# Patient Record
Sex: MALE | URBAN - METROPOLITAN AREA
[De-identification: names, ages, dates, MRNs, and addresses within clinical notes are randomized per-mention and may not be internally consistent; named-entity substitution may affect disease eponyms.]

---

## 2017-03-23 ENCOUNTER — APPOINTMENT (RX ONLY)
Dept: URBAN - METROPOLITAN AREA CLINIC 57 | Facility: CLINIC | Age: 52
Setting detail: DERMATOLOGY
End: 2017-03-23

## 2017-03-23 DIAGNOSIS — F17.200 NICOTINE DEPENDENCE, UNSPECIFIED, UNCOMPLICATED: ICD-10-CM

## 2017-03-23 DIAGNOSIS — L40.4 GUTTATE PSORIASIS: ICD-10-CM

## 2017-03-23 PROBLEM — L29.8 OTHER PRURITUS: Status: ACTIVE | Noted: 2017-03-23

## 2017-03-23 PROCEDURE — ? TREATMENT REGIMEN

## 2017-03-23 PROCEDURE — 99203 OFFICE O/P NEW LOW 30 MIN: CPT

## 2017-03-23 PROCEDURE — ? PRESCRIPTION

## 2017-03-23 PROCEDURE — ? COUNSELING

## 2017-03-23 RX ORDER — DESOXIMETASONE 2.5 MG/ML
SPRAY TOPICAL
Qty: 1 | Refills: 2 | Status: ERX | COMMUNITY
Start: 2017-03-23

## 2017-03-23 RX ADMIN — DESOXIMETASONE: 2.5 SPRAY TOPICAL at 13:09

## 2017-03-23 ASSESSMENT — LOCATION ZONE DERM: LOCATION ZONE: TRUNK

## 2017-03-23 ASSESSMENT — LOCATION DETAILED DESCRIPTION DERM: LOCATION DETAILED: LEFT MID-UPPER BACK

## 2017-03-23 ASSESSMENT — LOCATION SIMPLE DESCRIPTION DERM: LOCATION SIMPLE: LEFT UPPER BACK

## 2017-03-23 NOTE — PROCEDURE: TREATMENT REGIMEN
Detail Level: Zone
Action 3: Continue
Initiate Regimen: Gave sample of TSal to leave in 10 minutes before rinsing out

## 2017-04-10 ENCOUNTER — APPOINTMENT (RX ONLY)
Dept: URBAN - METROPOLITAN AREA CLINIC 57 | Facility: CLINIC | Age: 52
Setting detail: DERMATOLOGY
End: 2017-04-10

## 2017-04-10 DIAGNOSIS — L40.4 GUTTATE PSORIASIS: ICD-10-CM | Status: INADEQUATELY CONTROLLED

## 2017-04-10 PROCEDURE — ? COUNSELING

## 2017-04-10 PROCEDURE — ? STELARA INITIATION

## 2017-04-10 PROCEDURE — 99213 OFFICE O/P EST LOW 20 MIN: CPT

## 2017-04-10 PROCEDURE — 86580 TB INTRADERMAL TEST: CPT

## 2017-04-10 PROCEDURE — ? TREATMENT REGIMEN

## 2017-04-10 PROCEDURE — ? ORDER TESTS

## 2017-04-10 PROCEDURE — ? PPD

## 2017-04-10 PROCEDURE — ? PRESCRIPTION

## 2017-04-10 RX ORDER — USTEKINUMAB 45 MG/.5ML
INJECTION, SOLUTION SUBCUTANEOUS
Qty: 0.5 | Refills: 5 | Status: CANCELLED
Stop reason: CLARIF

## 2017-04-10 ASSESSMENT — LOCATION SIMPLE DESCRIPTION DERM
LOCATION SIMPLE: LEFT UPPER BACK
LOCATION SIMPLE: RIGHT FOREARM

## 2017-04-10 ASSESSMENT — LOCATION ZONE DERM
LOCATION ZONE: TRUNK
LOCATION ZONE: ARM

## 2017-04-10 ASSESSMENT — LOCATION DETAILED DESCRIPTION DERM
LOCATION DETAILED: LEFT MID-UPPER BACK
LOCATION DETAILED: RIGHT VENTRAL PROXIMAL FOREARM

## 2017-04-10 NOTE — PROCEDURE: TREATMENT REGIMEN
Detail Level: Zone
Action 4: Continue
Initiate Regimen: Gave sample of TSal to leave in 10 minutes before rinsing out
Plan: Discussed UVB, but he travels a lot for his job so that would not be practical. Discussed using MTX and the side effects along with biologics. He would like to try biologic so will try to get him approved for [facility].

## 2017-05-26 ENCOUNTER — RX ONLY (OUTPATIENT)
Age: 52
Setting detail: RX ONLY
End: 2017-05-26

## 2017-05-26 RX ORDER — ADALIMUMAB 40MG/0.8ML
KIT SUBCUTANEOUS
Qty: 1 | Refills: 5 | Status: ERX | COMMUNITY
Start: 2017-05-26

## 2017-05-30 ENCOUNTER — RX ONLY (OUTPATIENT)
Age: 52
Setting detail: RX ONLY
End: 2017-05-30

## 2017-05-30 RX ORDER — ADALIMUMAB 40MG/0.8ML
KIT SUBCUTANEOUS
Qty: 1 | Refills: 5 | Status: ERX

## 2018-05-22 ENCOUNTER — APPOINTMENT (RX ONLY)
Dept: URBAN - METROPOLITAN AREA CLINIC 57 | Facility: CLINIC | Age: 53
Setting detail: DERMATOLOGY
End: 2018-05-22

## 2018-05-22 DIAGNOSIS — L40.4 GUTTATE PSORIASIS: ICD-10-CM

## 2018-05-22 DIAGNOSIS — Z79.899 OTHER LONG TERM (CURRENT) DRUG THERAPY: ICD-10-CM

## 2018-05-22 PROBLEM — L40.0 PSORIASIS VULGARIS: Status: ACTIVE | Noted: 2018-05-22

## 2018-05-22 PROCEDURE — 99213 OFFICE O/P EST LOW 20 MIN: CPT

## 2018-05-22 PROCEDURE — ? COUNSELING

## 2018-05-22 PROCEDURE — ? TREATMENT REGIMEN

## 2018-05-22 PROCEDURE — ? PRESCRIPTION

## 2018-05-22 RX ORDER — ADALIMUMAB 40MG/0.8ML
KIT SUBCUTANEOUS
Qty: 1 | Refills: 11 | Status: ERX | COMMUNITY
Start: 2018-05-22

## 2018-05-22 RX ORDER — FLUOCINONIDE 0.5 MG/ML
SOLUTION TOPICAL
Qty: 60 | Refills: 5 | Status: ERX | COMMUNITY
Start: 2018-05-22

## 2018-05-22 RX ADMIN — FLUOCINONIDE: 0.5 SOLUTION TOPICAL at 19:27

## 2018-05-22 RX ADMIN — ADALIMUMAB: KIT at 19:27

## 2018-05-22 ASSESSMENT — LOCATION SIMPLE DESCRIPTION DERM: LOCATION SIMPLE: LEFT UPPER BACK

## 2018-05-22 ASSESSMENT — LOCATION ZONE DERM: LOCATION ZONE: TRUNK

## 2018-05-22 ASSESSMENT — LOCATION DETAILED DESCRIPTION DERM: LOCATION DETAILED: LEFT MID-UPPER BACK

## 2018-05-22 NOTE — PROCEDURE: TREATMENT REGIMEN
Initiate Regimen: Fluocinonide Solutuion to scalp BID PRN
Detail Level: Zone
Action 3: Continue
Continue Regimen: Humira injections biwekly

## 2020-06-15 ENCOUNTER — OFFICE VISIT (OUTPATIENT)
Dept: URGENT CARE | Facility: CLINIC | Age: 55
End: 2020-06-15
Payer: OTHER MISCELLANEOUS

## 2020-06-15 VITALS
DIASTOLIC BLOOD PRESSURE: 90 MMHG | TEMPERATURE: 98.5 F | OXYGEN SATURATION: 99 % | HEART RATE: 79 BPM | WEIGHT: 168.5 LBS | SYSTOLIC BLOOD PRESSURE: 138 MMHG

## 2020-06-15 DIAGNOSIS — S81.812A LACERATION OF SKIN OF LEFT LOWER LEG, INITIAL ENCOUNTER: Primary | ICD-10-CM

## 2020-06-15 PROCEDURE — 99202 OFFICE O/P NEW SF 15 MIN: CPT | Performed by: NURSE PRACTITIONER

## 2020-06-15 RX ORDER — ADALIMUMAB 40MG/0.8ML
40 KIT SUBCUTANEOUS
COMMUNITY
Start: 2020-06-01

## 2020-06-15 NOTE — PROGRESS NOTES
Subjective     Abrahan is a 54-year-old male who presents with multiple lacerations.    HPI    Abrahan is a 54-year-old male who presents to urgent care today with chief complaint of multiple lacerations to his left lower shin following an injury at work.  He reports that he was working with a  when it suddenly slipped from his hand falling and grazing his left shin.  He was able to kick it away with his right foot before causing much more damage.  It did bleed a small amount at the time of injury.  He covered it with a paper towel and presented to urgent care.  He reports his pain 3/10, constant burning in nature.  He denies decreased range of motion, numbness or tingling to the affected extremity.  He has not taken anything over-the-counter for pain relief.  Tetanus up-to-date, 3 years ago.  He is a current smoker, 1-1/2 packs/day.  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  He reports a significant history of RA and is on Humira.    The following have been reviewed and updated as appropriate in this visit:         Review of Systems    As noted in HPI.    Objective   /90 (BP Location: Left arm, Patient Position: Sitting, Cuff Size: Long Adult)   Pulse 79   Temp 36.9 °C (98.5 °F) (Temporal)   Wt 76.4 kg (168 lb 8 oz)   SpO2 99%     Physical Exam  Vitals signs and nursing note reviewed.   Constitutional:       Appearance: Normal appearance.   Cardiovascular:      Rate and Rhythm: Normal rate and regular rhythm.      Pulses: Normal pulses.      Heart sounds: Normal heart sounds.   Pulmonary:      Effort: Pulmonary effort is normal.      Breath sounds: Normal breath sounds.   Musculoskeletal: Normal range of motion.   Skin:     General: Skin is warm and dry.      Capillary Refill: Capillary refill takes less than 2 seconds.      Findings: Laceration present.      Comments: There are multiple superficial lacerations to his left shin area.  5 longer ones measuring about 3 to 5 cm  each.  Many shorter ones between measuring about 2 to 3 cm each.  Hemostasis achieved.   Neurological:      General: No focal deficit present.      Mental Status: He is alert and oriented to person, place, and time. Mental status is at baseline.   Psychiatric:         Mood and Affect: Mood normal.         Behavior: Behavior normal.         Thought Content: Thought content normal.         Judgment: Judgment normal.       Assessment/Plan   Diagnoses and all orders for this visit:    Laceration of skin of left lower leg, initial encounter    The wounds were cleansed with Hibiclens and normal saline.  Bacitracin was applied to each wound.  Wounds were covered with Telfa dressing and Coban.  He is to leave the dressing on for the next 24 hours, keeping it clean and dry.  He may leave the wound open to air after this time and apply bacitracin daily for a few days.  Patient was educated on wound care and signs and symptoms of infection.  I have advised the patient on smoking cessation.  Strongly encouraged him to at least cut back while wounds are healing.  Patient should follow up with urgent care if not improving. Discussed signs and symptoms of when to return sooner. Patient voices understanding and agrees with plan.     Kaye Jimenez, CNP

## 2020-06-15 NOTE — PATIENT INSTRUCTIONS
Patient Education     Nonsutured Laceration Care  A laceration is a cut that may go through all layers of the skin and extend into the tissue that is right under the skin. This type of cut is usually stitched up (sutured) or closed with tape (adhesive strips) or skin glue shortly after the injury happens.  However, if the wound is dirty or if several hours pass before medical treatment is provided, it is likely that germs (bacteria) will enter the wound. Closing a laceration after bacteria have entered it increases the risk of infection. In these cases, your health care provider may leave the laceration open (nonsutured) and cover it with a bandage. This type of treatment helps prevent infection and allows the wound to heal from the deepest layer of tissue damage up to the surface.  An open fracture is a type of injury that may involve nonsutured lacerations. An open fracture is a break in a bone that happens along with lacerations through the skin at the fracture site.  What are the risks?  Caring for a nonsutured laceration is safe. However, problems may occur, including a higher risk for:  · Scarring.  · Infection.  · Slow healing.  Supplies needed:  · Soap.  · Hand .  · Sterile water or irrigation solution.  · Bandages (dressings).  · Clean towel.  · Antibiotic ointment.  How to care for your nonsutured laceration  Follow instructions from your health care provider about how to take care of your wound.  · Keep the wound clean and dry.  · Change any dressings as told by your health care provider. This includes changing the dressing when it starts to smell, or when it gets wet or dirty.  · Clean the wound one time each day, or as often as told by your health care provider. To clean your wound:  ? Wash your hands with soap and water. If soap and water are not available, use hand .  ? Remove any dressing as told by your health care provider.  ? Clean the wound with sterile water or irrigation  solution as told by your health care provider.  ? Pat the wound dry with a clean towel. Do not rub the wound.  ? Apply a thin layer of antibiotic ointment to the wound as told by your health care provider. This will prevent infection and keep the dressing from sticking to the wound.  ? Apply a new dressing as told by your health care provider.  · Check your wound every day for signs of infection. Watch for:  ? Redness, swelling, or pain.  ? Fluid, blood, or pus.  ? Bad smell on the wound or dressing.  ? Warmth.  · Do not take baths, swim, or do anything that puts your wound underwater until your health care provider approves.  · Do not scratch or pick at the wound.  Follow these instructions at home:  · Take or apply over-the-counter and prescription medicines only as told by your health care provider.  · If you were prescribed an antibiotic medicine, take or apply it as told by your health care provider. Do not stop using the antibiotic even if your condition improves.  · Do not inject anything into the wound unless directed by your health care provider.  · Raise (elevate) the injured area above the level of your heart while you are sitting or lying down, if possible.  · If directed, put ice on the affected area:  ? Put ice in a plastic bag.  ? Place a towel between your skin and the bag.  ? Leave the ice on for 20 minutes, 2-3 times a day.  · Keep all follow-up visits as told by your health care provider. This is important.  Contact a health care provider if:  · You received a tetanus shot and you have swelling, severe pain, redness, or bleeding at the injection site.  · You have a fever.  · Your pain is not controlled with medicine.  · You have increased redness, swelling, or pain at the site of your wound.  · You have fluid, blood, or pus coming from your wound.  · You notice a bad smell coming from your wound or your dressing.  · You notice something coming out of the wound, such as wood or glass.  · You notice  a change in the color of your skin near your wound.  · You develop a new rash.  · You need to change the dressing frequently due to fluid, blood, or pus draining from the wound.  · You develop numbness around your wound.  Get help right away if:  · Your pain suddenly increases and is severe.  · You develop severe swelling around the wound.  · The wound is on your hand or foot and you cannot properly move a finger or toe.  · The wound is on your hand or foot, and you notice that your fingers or toes look pale or bluish.  · You have a red streak going away from your wound.  Summary  · A laceration is a cut that may go through all layers of the skin and extend into the tissue that is right under the skin. It is usually closed with stitches, tape, or skin glue shortly after the injury happens.  · If a wound is dirty or if several hours pass before medical treatment is provided, the laceration may be kept open (nonsutured) and covered with a bandage.  · This type of treatment helps prevent infection and allows the wound to heal from the deepest layer of tissue damage up to the surface.  · Follow instructions from your health care provider about how to take care of your wound.  This information is not intended to replace advice given to you by your health care provider. Make sure you discuss any questions you have with your health care provider.  Document Released: 11/15/2007 Document Revised: 04/10/2020 Document Reviewed: 01/07/2019  Elsevier Patient Education © 2020 [employer]

## 2020-07-28 ENCOUNTER — OFFICE VISIT (OUTPATIENT)
Dept: URGENT CARE | Facility: CLINIC | Age: 55
End: 2020-07-28
Payer: COMMERCIAL

## 2020-07-28 VITALS
DIASTOLIC BLOOD PRESSURE: 90 MMHG | SYSTOLIC BLOOD PRESSURE: 142 MMHG | HEART RATE: 66 BPM | WEIGHT: 169.5 LBS | TEMPERATURE: 98.4 F | OXYGEN SATURATION: 98 %

## 2020-07-28 DIAGNOSIS — M06.4: ICD-10-CM

## 2020-07-28 DIAGNOSIS — G47.01 INSOMNIA DUE TO MEDICAL CONDITION: Primary | ICD-10-CM

## 2020-07-28 PROCEDURE — 99213 OFFICE O/P EST LOW 20 MIN: CPT | Performed by: NURSE PRACTITIONER

## 2020-07-28 RX ORDER — PREDNISONE 10 MG/1
TABLET ORAL
Qty: 30 TABLET | Refills: 0 | Status: SHIPPED | OUTPATIENT
Start: 2020-07-28

## 2020-07-28 RX ORDER — PREDNISONE 5 MG/1
5 TABLET ORAL DAILY PRN
COMMUNITY
Start: 2020-06-23

## 2020-07-28 RX ORDER — FOLIC ACID 1 MG/1
1000 TABLET ORAL DAILY
COMMUNITY
Start: 2020-07-21

## 2020-07-28 RX ORDER — TRAZODONE HYDROCHLORIDE 50 MG/1
50 TABLET ORAL NIGHTLY
Qty: 14 TABLET | Refills: 0 | Status: SHIPPED | OUTPATIENT
Start: 2020-07-28 | End: 2020-08-11

## 2020-07-28 RX ORDER — METHOTREXATE 25 MG/ML
0.5 INJECTION INTRA-ARTERIAL; INTRAMUSCULAR; INTRATHECAL; INTRAVENOUS WEEKLY
COMMUNITY
Start: 2020-06-23
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
